# Patient Record
Sex: MALE | Race: BLACK OR AFRICAN AMERICAN | NOT HISPANIC OR LATINO | URBAN - METROPOLITAN AREA
[De-identification: names, ages, dates, MRNs, and addresses within clinical notes are randomized per-mention and may not be internally consistent; named-entity substitution may affect disease eponyms.]

---

## 2020-03-06 ENCOUNTER — EMERGENCY (EMERGENCY)
Facility: HOSPITAL | Age: 20
LOS: 1 days | Discharge: HOME | End: 2020-03-06
Attending: EMERGENCY MEDICINE
Payer: COMMERCIAL

## 2020-03-06 VITALS
HEART RATE: 95 BPM | SYSTOLIC BLOOD PRESSURE: 150 MMHG | HEIGHT: 71 IN | OXYGEN SATURATION: 97 % | RESPIRATION RATE: 17 BRPM | TEMPERATURE: 98 F | DIASTOLIC BLOOD PRESSURE: 87 MMHG | WEIGHT: 139.99 LBS

## 2020-03-06 PROCEDURE — 99283 EMERGENCY DEPT VISIT LOW MDM: CPT

## 2020-03-06 RX ORDER — PENICILLIN V POTASSIUM 250 MG
1 TABLET ORAL
Qty: 20 | Refills: 0
Start: 2020-03-06 | End: 2020-03-12

## 2020-03-06 RX ORDER — IBUPROFEN 200 MG
600 TABLET ORAL ONCE
Refills: 0 | Status: COMPLETED | OUTPATIENT
Start: 2020-03-06 | End: 2020-03-06

## 2020-03-06 RX ORDER — ACETAMINOPHEN 500 MG
650 TABLET ORAL ONCE
Refills: 0 | Status: COMPLETED | OUTPATIENT
Start: 2020-03-06 | End: 2020-03-06

## 2020-03-06 RX ORDER — PENICILLIN V POTASSIUM 250 MG
500 TABLET ORAL ONCE
Refills: 0 | Status: COMPLETED | OUTPATIENT
Start: 2020-03-06 | End: 2020-03-06

## 2020-03-06 RX ADMIN — Medication 600 MILLIGRAM(S): at 15:51

## 2020-03-06 RX ADMIN — Medication 650 MILLIGRAM(S): at 16:09

## 2020-03-06 RX ADMIN — Medication 500 MILLIGRAM(S): at 16:08

## 2020-03-06 RX ADMIN — Medication 600 MILLIGRAM(S): at 16:09

## 2020-03-06 RX ADMIN — Medication 650 MILLIGRAM(S): at 15:50

## 2020-03-06 NOTE — ED PROVIDER NOTE - ATTENDING CONTRIBUTION TO CARE
throbbing sensation right lower molar for 2 weeks.  taking ibu with no relief.  reports cold sensitivity.  PE:  no trismus, no sublingual or submandibular swelling, no gingival swelling or erythema, tooth percussion tenderness #35, no submandibular adenopathy.  A/P  dental pain: anticipatory guidance provided and supportive care/ dental follow-up advised.

## 2020-03-06 NOTE — ED PROVIDER NOTE - OBJECTIVE STATEMENT
Pt is a 19y M with no PMH presenting for dental pain. Pt states his wisdom teeth are coming in and causing pain on the lower R jaw. He reports pain on and off for 2-3 weeks. Pt reports taking motrin at home. Pt is a 19y M with no PMH presenting for dental pain. Pt states his wisdom teeth are coming in and causing pain on the lower R jaw. He reports pain on and off for 2-3 weeks. Pt reports taking motrin at home. Last dose at 3am. Pt states he does not have a dentist. He denies fever/chills/swelling/throat pain/difficulty swallowing. No other complaints. NKDA. PT denies smoking/drinking/drug use.

## 2020-03-06 NOTE — ED ADULT TRIAGE NOTE - CHIEF COMPLAINT QUOTE
Patient states that he has been having pain in his right lower tooth for the last 2 weeks. Pt does not have a dentist. denies any fevers.

## 2020-03-06 NOTE — ED PROVIDER NOTE - NSFOLLOWUPCLINICS_GEN_ALL_ED_FT
[facility]  [place] [address]  Phone: [phone]  Fax:   Follow Up Time:

## 2020-03-06 NOTE — ED PROVIDER NOTE - PATIENT PORTAL LINK FT
You can access the FollowMyHealth Patient Portal offered by [facility] by registering at the following website: [email or web address]. By joining [employer]’s FollowMyHealth portal, you will also be able to view your health information using other applications (apps) compatible with our system.

## 2020-03-06 NOTE — ED PROVIDER NOTE - NSFOLLOWUPINSTRUCTIONS_ED_ALL_ED_FT
Dental Pain    Dental pain (toothache) may be caused by many things including tooth decay (cavities or caries), abscess or infection, or trauma. If you were prescribed an antibiotic medicine, finish all of it even if you start to feel better. Rinsing your mouth with salt water or applying ice to the painful area of your face may help with the pain. Follow up with a dentist is important in ensuring good oral health and preventing the worsening of dental disease.    SEEK IMMEDIATE MEDICAL CARE IF YOU HAVE ANY OF THE FOLLOWING SYMPTOMS: unable to open your mouth, trouble breathing or swallowing, fever, or swelling of the face, neck, or jaw.     tylenol/motrin for pain.  Follow up with dental clinic.

## 2021-02-12 NOTE — ED PROVIDER NOTE - CONSTITUTIONAL, MLM
127 normal... Well appearing, awake, alert, oriented to person, place, time/situation and in no apparent distress.

## 2021-12-03 ENCOUNTER — TRANSCRIPTION ENCOUNTER (OUTPATIENT)
Age: 21
End: 2021-12-03

## 2022-01-31 ENCOUNTER — EMERGENCY (EMERGENCY)
Facility: HOSPITAL | Age: 22
LOS: 1 days | Discharge: HOME | End: 2022-01-31
Attending: EMERGENCY MEDICINE
Payer: COMMERCIAL

## 2022-01-31 VITALS
TEMPERATURE: 97 F | OXYGEN SATURATION: 99 % | RESPIRATION RATE: 18 BRPM | HEART RATE: 84 BPM | HEIGHT: 71 IN | DIASTOLIC BLOOD PRESSURE: 93 MMHG | SYSTOLIC BLOOD PRESSURE: 154 MMHG

## 2022-01-31 PROCEDURE — 99284 EMERGENCY DEPT VISIT MOD MDM: CPT

## 2022-01-31 PROCEDURE — 99285 EMERGENCY DEPT VISIT HI MDM: CPT | Mod: 25

## 2022-01-31 PROCEDURE — 93010 ELECTROCARDIOGRAM REPORT: CPT

## 2022-01-31 PROCEDURE — 93005 ELECTROCARDIOGRAM TRACING: CPT

## 2022-01-31 PROCEDURE — 71046 X-RAY EXAM CHEST 2 VIEWS: CPT | Mod: 26

## 2022-01-31 PROCEDURE — 71046 X-RAY EXAM CHEST 2 VIEWS: CPT

## 2022-01-31 RX ORDER — MORPHINE SULFATE 50 MG/1
4 CAPSULE, EXTENDED RELEASE ORAL ONCE
Refills: 0 | Status: DISCONTINUED | OUTPATIENT
Start: 2022-01-31 | End: 2022-01-31

## 2022-01-31 RX ORDER — SODIUM CHLORIDE 9 MG/ML
1000 INJECTION INTRAMUSCULAR; INTRAVENOUS; SUBCUTANEOUS ONCE
Refills: 0 | Status: DISCONTINUED | OUTPATIENT
Start: 2022-01-31 | End: 2022-01-31

## 2022-01-31 RX ORDER — LIDOCAINE 4 G/100G
10 CREAM TOPICAL ONCE
Refills: 0 | Status: COMPLETED | OUTPATIENT
Start: 2022-01-31 | End: 2022-01-31

## 2022-01-31 RX ORDER — SUCRALFATE 1 G
1 TABLET ORAL ONCE
Refills: 0 | Status: COMPLETED | OUTPATIENT
Start: 2022-01-31 | End: 2022-01-31

## 2022-01-31 RX ADMIN — Medication 1 GRAM(S): at 21:57

## 2022-01-31 RX ADMIN — Medication 30 MILLILITER(S): at 21:57

## 2022-01-31 RX ADMIN — LIDOCAINE 10 MILLILITER(S): 4 CREAM TOPICAL at 21:57

## 2022-01-31 NOTE — ED STATDOCS - PHYSICAL EXAMINATION
Gen: NAD, AOx3  Head: NCAT  HEENT: PERRL, oral mucosa moist, normal conjunctiva, oropharynx clear without exudate or erythema  Lung: CTAB, no respiratory distress, no wheezing, rales, rhonchi  CV: normal s1/s2, rrr, no murmurs, Normal perfusion, pulses 2+ throughout  Abd: soft, NTND, no CVA tenderness  MSK: No edema, no visible deformities, full range of motion in all 4 extremities  Neuro: CN II-XII grossly intact, No focal neurologic deficits  Skin: No rash   Psych: normal affect

## 2022-01-31 NOTE — ED STATDOCS - CARE PROVIDER_API CALL
Harrison Malone)  Gastroenterology; Internal Medicine  [address], [address] [facility] [address]  [place] [address]  Phone: [phone]  Fax: [phone]  Follow Up Time:

## 2022-01-31 NOTE — ED STATDOCS - PATIENT PORTAL LINK FT
You can access the FollowMyHealth Patient Portal offered by [facility] by registering at the following website: [email or web address]. By joining [employer]’s FollowMyHealth portal, you will also be able to view your health information using other applications (apps) compatible with our system.

## 2022-01-31 NOTE — ED STATDOCS - CLINICAL SUMMARY MEDICAL DECISION MAKING FREE TEXT BOX
Pt will likely pill esophagitis likely secondary to taking doxycycline. Treat with Maalox, Carafate, viscous lidocaine, chest x-ray, EKG, reassess.

## 2022-01-31 NOTE — ED STATDOCS - ATTENDING CONTRIBUTION TO CARE
I, Rhoda Mccauley, performed a face to face bedside interview with this patient regarding history of present illness, review of symptoms and relevant past medical, social and family history.  I completed an independent physical examination. Medical decision making, follow-up on ordered tests (ie labs, radiologic studies) and re-evaluation of the patient's status has been communicated to the ACP.  Disposition of the patient will be based on test outcome and response to ED interventions.

## 2022-01-31 NOTE — ED STATDOCS - NSFOLLOWUPINSTRUCTIONS_ED_ALL_ED_FT
- Prescription sent to pharmacy.  - Discontinue doxycycline.   - Please bring all documentation from your ED visit to any related future follow up appointment.  - Please call to schedule follow up appointment with your primary care physician within 24-48 hours.  - Please seek immediate medical attention or return to the ED for any new/worsening, signs/symptoms, or concerns.    Feel better!       Corrosive Esophagitis    WHAT YOU NEED TO KNOW:    What is corrosive esophagitis? Corrosive esophagitis is damage to your esophagus from harmful substances. The damage may cause inflammation, ulcers, or scarring.    Digestive Tract         What increases my risk for corrosive esophagitis?   •Swallowing strong chemicals such as detergents, dishwashing liquid, or drain       •Radiation therapy to kill cancer cells      •Certain medicines, such as antibiotics, pain medicines, and drugs for osteoporosis (weak bones)      •Pills that get caught in your throat if you have a narrow esophagus or do not swallow enough liquid with the pills      •Digestion changes or problems as you get older      •Certain conditions that cause your esophagus to narrow or the muscles not to work correctly      •A dry mouth condition that causes less saliva to be made      •Heart problems that cause your heart to get bigger and press on your esophagus can make the opening smaller      What are the signs and symptoms of corrosive esophagitis?   •Chest pain that is sudden or happens after you take a pill      •Pain when you swallow liquids or food      •Loss of appetite      •Vomiting blood      How is corrosive esophagitis diagnosed? Your healthcare provider will ask about your symptoms and other health problems. He or she may ask which pills you have taken or treatments you have received. He or she may ask if you have swallowed any harmful liquids. You may need one or more of the following:  •A barium swallow is used to take pictures of your abdomen. You will need to swallow a thick liquid called barium that helps the intestines show up better on x-ray.      •Endoscopy is a procedure used to see the inside of your esophagus and stomach. A flexible tube with a small light and camera on the end are used. Your healthcare provider will look for any bleeding, lumps, narrowing, scars, tears, or pill pieces. He or she may take a small amount of tissue from your esophagus to be tested.  Upper Endoscopy           How is corrosive esophagitis treated? Your healthcare provider may have you stop certain medicine or treatments for a period of time. This will give your esophagus time to heal. Do not stop any treatments without talking to your provider first. You may also need the following:   •Medicines may be given to decrease inflammation or irritation from stomach acids. They may help increase the protective lining of the esophagus to help it heal. You may also need antibiotics to treat or prevent a bacterial infection in your esophagus.      •Dilatation is a procedure used to widen the esophagus. A small balloon, dilator, or stent is placed in your esophagus and expanded.      •Surgery may be needed to remove an area of your esophagus. It may be replaced with a portion of your stomach or colon.      What can I do to prevent corrosive esophagitis?   •Sit or stand when you take your medicine. Do not lie down after you take your pills. Stay in an upright position for 10 to 15 minutes after you take your pills.      •Store harmful chemicals in a safe location. Label bottles with harmful substances.      •Ask for other ways to take your medicine. If you have a narrow esophagus, ask if you can take your medicine in liquid form. Ask if you can crush the pill and mix it with liquid to drink. If you must swallow pills, take them 1 at a time. Take each one with at least 4 ounces of liquid.      When should I seek immediate care?   •You feel like food or medicine is stuck in your esophagus and it does not go down when you drink water.      •Your vomit has blood in it or looks like coffee grounds.      •You have black or bloody bowel movements.      •Your symptoms are getting worse.      When should I call my doctor?   •You have a fever.      •You have pain that does not decrease or go away after you take your pain medicine.      •You vomit and cannot keep food or liquids down.      •Your stomach feels very full, and you cannot burp or vomit.      •You have questions or concerns about your condition or care.      CARE AGREEMENT:    You have the right to help plan your care. Learn about your health condition and how it may be treated. Discuss treatment options with your healthcare providers to decide what care you want to receive. You always have the right to refuse treatment.

## 2022-01-31 NOTE — ED ADULT TRIAGE NOTE - CHIEF COMPLAINT QUOTE
pt reports since taking doxycyline he has had intermittent chest pain which has worsened this morning. c/o tightness in chest when trying to breathe or swallow. pt's airway patient, in no distress at this time.

## 2022-01-31 NOTE — ED STATDOCS - PROGRESS NOTE DETAILS
LUCIA Shah: Patient evaluated by intake physician. HPI/ROS/PE as noted above. Will follow up plan per intake physician and continue to assess patient.

## 2022-01-31 NOTE — ED STATDOCS - OBJECTIVE STATEMENT
20 y/o male with no PMHx c/o chest pain ever since he started taking doxycycline for both gonorrhea and Chlamydia. Pt states he feels the chest pain goes into his throat and states he feels like he has difficulty breathing. Pt states he has been drinking and adequate amount of water. Pt states he never had any symptoms including of chlamydia or gonorrhea , he just tested positive during his yearly physical. Pt denies known drug allergies.

## 2022-01-31 NOTE — ED ADULT NURSE NOTE - OBJECTIVE STATEMENT
Pt A&O x 4 c/o chest, epigastric, and throat pain x 3 days. Denies spicy/greasy food, or ETOH intake. Denies SOB, N/V/D. Respirations even and unlabored. Pt medicated as prescribed. Will continue to monitor.

## 2022-03-22 PROBLEM — Z00.00 ENCOUNTER FOR PREVENTIVE HEALTH EXAMINATION: Status: ACTIVE | Noted: 2022-03-22

## 2022-04-04 ENCOUNTER — APPOINTMENT (OUTPATIENT)
Dept: GASTROENTEROLOGY | Facility: CLINIC | Age: 22
End: 2022-04-04